# Patient Record
(demographics unavailable — no encounter records)

---

## 2024-10-10 NOTE — PHYSICAL EXAM
[de-identified] : The patient is sitting comfortably in the exam room. RIGHT hip -Skin is intact, no swelling, no ecchymosis -incisions well-healed, no erythema, no signs of infection -No tenderness to palpation over the greater trochanter -Painless range of motion hip -Sensation is intact L1-S1 -5/5 EHL, FHL, TA, GS, quadriceps, hamstrings -Foot is warm and well-perfused, palpable dorsalis pedis pulse. [de-identified] :  X-rays of the right hip and AP pelvis were taken in the office today, 10/10/24. X-rays include AP and lateral of the hip.   Leg length x-rays were taken in the office today, 10/10/24.

## 2024-10-10 NOTE — PHYSICAL EXAM
[de-identified] : The patient is sitting comfortably in the exam room. RIGHT hip -Skin is intact, no swelling, no ecchymosis -incisions well-healed, no erythema, no signs of infection -No tenderness to palpation over the greater trochanter -Painless range of motion hip -Sensation is intact L1-S1 -5/5 EHL, FHL, TA, GS, quadriceps, hamstrings -Foot is warm and well-perfused, palpable dorsalis pedis pulse. [de-identified] :  X-rays of the right hip and AP pelvis were taken in the office today, 10/10/24. X-rays include AP and lateral of the hip.   Leg length x-rays were taken in the office today, 10/10/24.

## 2024-10-10 NOTE — DISCUSSION/SUMMARY
[de-identified] : 82-year-old gentleman s/p right hip IM nail, approximately 4 months out.   -X-ray and physical exam findings were discussed with the patient -Weightbearing as tolerated right LE -Physical therapy: gait training and strengthening exercises -Follow up in 4 months with x-rays of the right hip and AP pelvis at that time. -All the patient's questions and concerns were addressed during this visit.

## 2024-10-10 NOTE — DISCUSSION/SUMMARY
[de-identified] : 82-year-old gentleman s/p right hip IM nail, approximately 4 months out.   -X-ray and physical exam findings were discussed with the patient -Weightbearing as tolerated right LE -Physical therapy: gait training and strengthening exercises -Follow up in 4 months with x-rays of the right hip and AP pelvis at that time. -All the patient's questions and concerns were addressed during this visit.

## 2024-10-10 NOTE — HISTORY OF PRESENT ILLNESS
[de-identified] : Mr. JANET SANCHEZ is a 82 year old very pleasant [place] gentleman presenting for follow up appointment s/p right hip IM nail on 6/14/24. Since his last visit he states he is feeling better. He has been participating in PT twice a week. He walks with a limp due to weakness.   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

## 2024-10-10 NOTE — HISTORY OF PRESENT ILLNESS
[de-identified] : Mr. JANET SANCHEZ is a 82 year old very pleasant [place] gentleman presenting for follow up appointment s/p right hip IM nail on 6/14/24. Since his last visit he states he is feeling better. He has been participating in PT twice a week. He walks with a limp due to weakness.   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

## 2025-02-13 NOTE — PHYSICAL EXAM
[de-identified] : The patient is sitting comfortably in the exam room. RIGHT hip -Skin is intact, no swelling, no ecchymosis -incisions well-healed, no erythema, no signs of infection -No tenderness to palpation over the greater trochanter -Painless range of motion hip -Sensation is intact L1-S1 -5/5 EHL, FHL, TA, GS, quadriceps, hamstrings -Foot is warm and well-perfused, palpable dorsalis pedis pulse. [de-identified] :  X-rays of the right hip and AP pelvis were taken in the office today, 2/13/25. X-rays include AP and lateral of the hip.  X-rays show good overall alignment of the hip and pelvis.  The patient is status post short cephalomedullary nail on the right side.  There is interval healing at the intertrochanteric hip fracture.

## 2025-02-13 NOTE — HISTORY OF PRESENT ILLNESS
[de-identified] : Mr. JANET SANCHEZ is a 83 year old very pleasant [place] gentleman presenting for follow up appointment s/p right hip IM nail on 6/14/24. Since his last visit he states he is feeling better. He notes a limp due to weakness and has been participating in PT. He denies pain and is happy with his care.   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

## 2025-02-13 NOTE — HISTORY OF PRESENT ILLNESS
[de-identified] : Mr. JANET SANCHEZ is a 83 year old very pleasant [place] gentleman presenting for follow up appointment s/p right hip IM nail on 6/14/24. Since his last visit he states he is feeling better. He notes a limp due to weakness and has been participating in PT. He denies pain and is happy with his care.   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

## 2025-02-13 NOTE — PHYSICAL EXAM
[de-identified] : The patient is sitting comfortably in the exam room. RIGHT hip -Skin is intact, no swelling, no ecchymosis -incisions well-healed, no erythema, no signs of infection -No tenderness to palpation over the greater trochanter -Painless range of motion hip -Sensation is intact L1-S1 -5/5 EHL, FHL, TA, GS, quadriceps, hamstrings -Foot is warm and well-perfused, palpable dorsalis pedis pulse. [de-identified] :  X-rays of the right hip and AP pelvis were taken in the office today, 2/13/25. X-rays include AP and lateral of the hip.  X-rays show good overall alignment of the hip and pelvis.  The patient is status post short cephalomedullary nail on the right side.  There is interval healing at the intertrochanteric hip fracture.